# Patient Record
Sex: FEMALE | Race: WHITE | URBAN - METROPOLITAN AREA
[De-identification: names, ages, dates, MRNs, and addresses within clinical notes are randomized per-mention and may not be internally consistent; named-entity substitution may affect disease eponyms.]

---

## 2017-06-22 ENCOUNTER — IMPORTED ENCOUNTER (OUTPATIENT)
Dept: URBAN - METROPOLITAN AREA CLINIC 31 | Facility: CLINIC | Age: 52
End: 2017-06-22

## 2017-06-22 PROBLEM — H40.033: Noted: 2017-06-22

## 2017-06-22 PROCEDURE — 92020 GONIOSCOPY: CPT

## 2017-06-22 PROCEDURE — 99203 OFFICE O/P NEW LOW 30 MIN: CPT

## 2017-06-22 NOTE — PATIENT DISCUSSION
Narrow angles OU: Risk of angle closure discussed. Recommend Yag PI od/osReturn for an appointment for Yag PI od/os with Dr. Eliecer Boateng.

## 2017-08-31 ENCOUNTER — IMPORTED ENCOUNTER (OUTPATIENT)
Dept: URBAN - METROPOLITAN AREA CLINIC 31 | Facility: CLINIC | Age: 52
End: 2017-08-31

## 2017-08-31 PROBLEM — H40.033: Noted: 2017-08-31

## 2017-08-31 PROCEDURE — 99024 POSTOP FOLLOW-UP VISIT: CPT

## 2017-08-31 NOTE — PATIENT DISCUSSION
1.  Narrow angles OU:  S/P PI ou 8/17. Obie [employer] PI's ou. Expl Risk of angle closure discussed. Expl she will need to be dilated in future. 2.  Return  3-6 mths CE and check PI's.

## 2022-04-01 ASSESSMENT — TONOMETRY
OD_IOP_MMHG: 22
OD_IOP_MMHG: 20
OS_IOP_MMHG: 21
OS_IOP_MMHG: 20

## 2022-04-01 ASSESSMENT — VISUAL ACUITY
OS_CC: 20/30
OD_SC: 20/20
OS_SC: 20/20
OS_SC: 20/20
OD_CC: 20/25-1
OD_SC: 20/20-1